# Patient Record
Sex: MALE | Race: WHITE | Employment: FULL TIME | URBAN - METROPOLITAN AREA
[De-identification: names, ages, dates, MRNs, and addresses within clinical notes are randomized per-mention and may not be internally consistent; named-entity substitution may affect disease eponyms.]

---

## 2022-01-06 ENCOUNTER — HOSPITAL ENCOUNTER (EMERGENCY)
Age: 32
Discharge: HOME | End: 2022-01-06
Attending: EMERGENCY MEDICINE

## 2022-01-06 VITALS
HEIGHT: 76 IN | HEART RATE: 108 BPM | RESPIRATION RATE: 16 BRPM | WEIGHT: 210 LBS | TEMPERATURE: 98.4 F | SYSTOLIC BLOOD PRESSURE: 123 MMHG | OXYGEN SATURATION: 95 % | DIASTOLIC BLOOD PRESSURE: 70 MMHG | BODY MASS INDEX: 25.57 KG/M2

## 2022-01-06 DIAGNOSIS — U07.1 COVID-19: Primary | ICD-10-CM

## 2022-01-06 LAB
COVID-19 RAPID TEST, COVR: DETECTED
SOURCE, COVRS: ABNORMAL

## 2022-01-06 PROCEDURE — 99283 EMERGENCY DEPT VISIT LOW MDM: CPT

## 2022-01-06 PROCEDURE — 87635 SARS-COV-2 COVID-19 AMP PRB: CPT

## 2022-01-06 NOTE — ED NOTES
I have reviewed discharge instructions with the patient. The patient verbalized understanding. Patient left ED via Discharge Method: ambulatory to Home with self. Opportunity for questions and clarification provided. Patient given 0 scripts. To continue your aftercare when you leave the hospital, you may receive an automated call from our care team to check in on how you are doing. This is a free service and part of our promise to provide the best care and service to meet your aftercare needs.  If you have questions, or wish to unsubscribe from this service please call [phone]. Thank you for Choosing our [facility] Emergency Department.

## 2022-01-06 NOTE — ED TRIAGE NOTES
Patients roommate with COVID, started with generalized body aches, headache, cough yesterday and woke up sweating this morning. Masked. Unvaccinated.

## 2022-01-06 NOTE — Clinical Note
[id number] [address] EMERGENCY DEPT  [address] [address]  [phone]    Work/School Note    Date: 1/6/2022     To Whom It May concern:    Meryl Mcclain was evaluated by the following provider(s):  Attending Provider: Susi Null MD  Physician Assistant: Shelly Cid virus is suspected. Per the CDC guidelines we recommend home isolation until the following conditions are all met:    1. At least five days have passed since symptoms first appeared and/or had a close exposure,   2. After home isolation for five days, wearing a mask around others for the next five days,  3. At least 24 have passed since last fever without the use of fever-reducing medications and  4.  Symptoms (eg cough, shortness of breath) have improved      Sincerely,          RAHUL Hall

## 2022-01-06 NOTE — DISCHARGE INSTRUCTIONS
Stay home and quarantined for at least 5 days, then if rapid test negative can return to work with a mask for 5 days. Drink plenty of fluids, rest, return to the ED if worsening in any way.

## 2022-01-06 NOTE — ED PROVIDER NOTES
Patient is here with nasal congestion, dry cough, body aches, chills, fever and not feeling well for 1 day. He did not get a COVID vaccine. No chest pain or shortness of breath, abdominal pain, dizziness, dyspnea on exertion, orthopnea, neck pain/stiffness, rash, swelling of his arms or legs, trouble with urination or bowel movements or other symptoms. He was ambulatory to the room without difficulty, and well-hydrated. The history is provided by the patient. Nasal Congestion  This is a new problem. The current episode started yesterday. The problem has not changed since onset. Pertinent negatives include no chest pain, no abdominal pain, no headaches and no shortness of breath. Nothing aggravates the symptoms. Nothing relieves the symptoms. He has tried nothing for the symptoms. No past medical history on file. Past Surgical History:   Procedure Laterality Date    HX GI      HX ORTHOPAEDIC      neck surgery         No family history on file. Social History     Socioeconomic History    Marital status: SINGLE     Spouse name: Not on file    Number of children: Not on file    Years of education: Not on file    Highest education level: Not on file   Occupational History    Not on file   Tobacco Use    Smoking status: Never Smoker    Smokeless tobacco: Not on file   Substance and Sexual Activity    Alcohol use: No    Drug use: Not on file    Sexual activity: Not on file   Other Topics Concern    Not on file   Social History Narrative    Not on file     Social Determinants of Health     Financial Resource Strain:     Difficulty of Paying Living Expenses: Not on file   Food Insecurity:     Worried About Running Out of Food in the Last Year: Not on file    [place] of Food in the Last Year: Not on file   Transportation Needs:     Lack of Transportation (Medical): Not on file    Lack of Transportation (Non-Medical):  Not on file   Physical Activity:     Days of Exercise per Week: Not on file   [place] of Exercise per Session: Not on file   Stress:     Feeling of Stress : Not on file   Social Connections:     Frequency of Communication with Friends and Family: Not on file    Frequency of Social Gatherings with Friends and Family: Not on file    Attends Buddhist Services: Not on file    Active Member of Clubs or Organizations: Not on file    Attends Club or Organization Meetings: Not on file    Marital Status: Not on file   Intimate Partner Violence:     Fear of Current or Ex-Partner: Not on file    Emotionally Abused: Not on file    Physically Abused: Not on file    Sexually Abused: Not on file   Housing Stability:     Unable to Pay for Housing in the Last Year: Not on file    Number of [place] in the Last Year: Not on file    Unstable Housing in the Last Year: Not on file         ALLERGIES: Patient has no known allergies. Review of Systems   Constitutional: Positive for chills. HENT: Positive for congestion and rhinorrhea. Eyes: Negative. Respiratory: Positive for cough. Negative for shortness of breath. Cardiovascular: Negative. Negative for chest pain. Gastrointestinal: Negative. Negative for abdominal pain. Genitourinary: Negative. Musculoskeletal: Positive for myalgias. Skin: Negative. Neurological: Negative. Negative for headaches. Psychiatric/Behavioral: Negative. All other systems reviewed and are negative. Vitals:    01/06/22 0848   BP: 123/70   Pulse: (!) 108   Resp: 16   Temp: 98.4 °F (36.9 °C)   SpO2: 95%   Weight: 95.3 kg (210 lb)   Height: 6' 4\" (1.93 m)            Physical Exam  Vitals and nursing note reviewed. Constitutional:       Appearance: Normal appearance. He is well-developed. HENT:      Head: Normocephalic and atraumatic. Right Ear: Tympanic membrane, ear canal and external ear normal.      Left Ear: Tympanic membrane, ear canal and external ear normal.      Nose: Rhinorrhea present.       Mouth/Throat: Mouth: Mucous membranes are moist.   Eyes:      Conjunctiva/sclera: Conjunctivae normal.      Pupils: Pupils are equal, round, and reactive to light. Cardiovascular:      Rate and Rhythm: Normal rate and regular rhythm. Pulses: Normal pulses. Heart sounds: Normal heart sounds. Pulmonary:      Effort: Pulmonary effort is normal.      Breath sounds: Normal breath sounds. Abdominal:      General: Bowel sounds are normal.      Palpations: Abdomen is soft. Musculoskeletal:         General: Normal range of motion. Cervical back: Normal range of motion and neck supple. Skin:     General: Skin is warm and dry. Capillary Refill: Capillary refill takes less than 2 seconds. Neurological:      General: No focal deficit present. Mental Status: He is alert and oriented to person, place, and time. Deep Tendon Reflexes: Reflexes are normal and symmetric. Psychiatric:         Mood and Affect: Mood normal.         Behavior: Behavior normal.         Thought Content: Thought content normal.         Judgment: Judgment normal.          MDM  Number of Diagnoses or Management Options     Amount and/or Complexity of Data Reviewed  Clinical lab tests: reviewed    Risk of Complications, Morbidity, and/or Mortality  Presenting problems: moderate  Diagnostic procedures: moderate  Management options: moderate    Patient Progress  Patient progress: stable         Procedures    The patient was observed in the ED. Results Reviewed:      Recent Results (from the past 24 hour(s))   COVID-19 RAPID TEST    Collection Time: 01/06/22  8:52 AM   Result Value Ref Range    Specimen source NASAL      COVID-19 rapid test Detected (AA) NOTD       Drink plenty of fluids, rest, return to the ED if worsening in any way. Stay home and quarantine for 5 days and if rapid test is negative can return to work with the mask for 5 more days.   Use over-the-counter ibuprofen and/or Tylenol as directed if needed for pain or fever.  I discussed the results of all labs, procedures, radiographs, and treatments with the patient and available family. Treatment plan is agreed upon and the patient is ready for discharge. All voiced understanding of the discharge plan and medication instructions or changes as appropriate. Questions about treatment in the ED were answered. All were encouraged to return should symptoms worsen or new problems develop.

## 2022-07-21 ENCOUNTER — HOSPITAL ENCOUNTER (EMERGENCY)
Age: 32
Discharge: HOME | End: 2022-07-21
Attending: EMERGENCY MEDICINE

## 2022-07-21 ENCOUNTER — HOSPITAL ENCOUNTER (EMERGENCY)
Dept: GENERAL RADIOLOGY | Age: 32
Discharge: HOME | End: 2022-07-24

## 2022-07-21 VITALS
HEIGHT: 76 IN | OXYGEN SATURATION: 98 % | SYSTOLIC BLOOD PRESSURE: 137 MMHG | DIASTOLIC BLOOD PRESSURE: 91 MMHG | TEMPERATURE: 98.1 F | HEART RATE: 90 BPM | RESPIRATION RATE: 16 BRPM | WEIGHT: 208 LBS | BODY MASS INDEX: 25.33 KG/M2

## 2022-07-21 DIAGNOSIS — S39.012A STRAIN OF LUMBAR REGION, INITIAL ENCOUNTER: Primary | ICD-10-CM

## 2022-07-21 PROCEDURE — 72100 X-RAY EXAM L-S SPINE 2/3 VWS: CPT

## 2022-07-21 PROCEDURE — 99284 EMERGENCY DEPT VISIT MOD MDM: CPT

## 2022-07-21 PROCEDURE — 6370000000 HC RX 637 (ALT 250 FOR IP): Performed by: PHYSICIAN ASSISTANT

## 2022-07-21 PROCEDURE — 6360000002 HC RX W HCPCS: Performed by: PHYSICIAN ASSISTANT

## 2022-07-21 PROCEDURE — 96372 THER/PROPH/DIAG INJ SC/IM: CPT

## 2022-07-21 RX ORDER — MELOXICAM 15 MG/1
15 TABLET ORAL DAILY
Qty: 30 TABLET | Refills: 1 | Status: SHIPPED | OUTPATIENT
Start: 2022-07-21

## 2022-07-21 RX ORDER — CYCLOBENZAPRINE HCL 10 MG
10 TABLET ORAL NIGHTLY PRN
Qty: 30 TABLET | Refills: 0 | Status: SHIPPED | OUTPATIENT
Start: 2022-07-21 | End: 2022-07-31

## 2022-07-21 RX ORDER — KETOROLAC TROMETHAMINE 30 MG/ML
30 INJECTION, SOLUTION INTRAMUSCULAR; INTRAVENOUS ONCE
Status: COMPLETED | OUTPATIENT
Start: 2022-07-21 | End: 2022-07-21

## 2022-07-21 RX ORDER — CYCLOBENZAPRINE HCL 10 MG
10 TABLET ORAL
Status: COMPLETED | OUTPATIENT
Start: 2022-07-21 | End: 2022-07-21

## 2022-07-21 RX ADMIN — KETOROLAC TROMETHAMINE 30 MG: 30 INJECTION, SOLUTION INTRAMUSCULAR; INTRAVENOUS at 18:53

## 2022-07-21 RX ADMIN — CYCLOBENZAPRINE 10 MG: 10 TABLET, FILM COATED ORAL at 18:52

## 2022-07-21 ASSESSMENT — PAIN - FUNCTIONAL ASSESSMENT: PAIN_FUNCTIONAL_ASSESSMENT: 0-10

## 2022-07-21 ASSESSMENT — ENCOUNTER SYMPTOMS
BACK PAIN: 1
COUGH: 0

## 2022-07-21 ASSESSMENT — PAIN DESCRIPTION - LOCATION: LOCATION: BACK

## 2022-07-21 ASSESSMENT — PAIN SCALES - GENERAL: PAINLEVEL_OUTOF10: 9

## 2022-07-21 NOTE — ED NOTES
I have reviewed discharge instructions with the patient. The patient verbalized understanding. Patient left ED via Discharge Method: ambulatory to Home with self. Opportunity for questions and clarification provided. Patient given 2 scripts. To continue your aftercare when you leave the hospital, you may receive an automated call from our care team to check in on how you are doing. This is a free service and part of our promise to provide the best care and service to meet your aftercare needs.  If you have questions, or wish to unsubscribe from this service please call [phone]. Thank you for Choosing our [facility] Emergency Department.         DEBORAH Lechuga  07/21/22 1954

## 2022-07-21 NOTE — ED TRIAGE NOTES
Patient co lower back pain that started yesterday. Pt denies any injury.   Pt denies any urinary symptoms but has hx kidney stones

## 2022-07-21 NOTE — ED PROVIDER NOTES
[employer] Emergency Department Provider Note                   PCP:                None Provider               Age: 33 y.o. Sex: male       ICD-10-CM    1. Strain of lumbar region, initial encounter  S39.012A           DISPOSITION Decision To Discharge 07/21/2022 07:46:18 PM        MDM  Number of Diagnoses or Management Options  Strain of lumbar region, initial encounter  Diagnosis management comments: Presented with back pain x2 days. Started yesterday. Work-up was unremarkable. Will treat symptomatically. Risk of Complications, Morbidity, and/or Mortality  Presenting problems: moderate  Diagnostic procedures: moderate  Management options: moderate    Patient Progress  Patient progress: stable       Orders Placed This Encounter   Procedures    XR LUMBAR SPINE (2-3 VIEWS)    POCT Urine Dipstick        Gerry Boothe is a 33 y.o. male who presents to the Emergency Department with chief complaint of    Chief Complaint   Patient presents with    Back Pain      Patient is a 70-year-old male presents this department with chief complaint of lower back pain. Reports his pain started yesterday. Has history of kidney stones and thinks he may be in the past once he became hydrated. States pain only got worse. Pain was initially on the left side however now is across his lumbar paraspinal region. Worse with palpation, straight leg test raise, weightbearing, standing. Alleviated by position. Pain is described as aching and does not radiate. The history is provided by the patient. No  was used. Review of Systems   Constitutional:  Negative for chills and fever. Respiratory:  Negative for cough. Musculoskeletal:  Positive for back pain. Neurological:  Negative for headaches. All other systems reviewed and are negative. No past medical history on file.      Past Surgical History:   Procedure Laterality Date    GI      ORTHOPEDIC SURGERY      neck surgery        No family history on file. Social History     Socioeconomic History    Marital status: Single   Tobacco Use    Smoking status: Never   Substance and Sexual Activity    Alcohol use: No         Patient has no known allergies. Previous Medications    KETOROLAC (TORADOL) 10 MG TABLET    Take 10 mg by mouth 3 times daily    ONDANSETRON (ZOFRAN) 8 MG TABLET    Take 8 mg by mouth every 8 hours as needed        Vitals signs and nursing note reviewed. Patient Vitals for the past 4 hrs:   Temp Pulse Resp BP SpO2   07/21/22 1729 98.1 °F (36.7 °C) 90 16 (!) 137/91 98 %          Physical Exam  Vitals and nursing note reviewed. Constitutional:       Appearance: Normal appearance. He is normal weight. HENT:      Head: Normocephalic and atraumatic. Nose: Nose normal.      Mouth/Throat:      Mouth: Mucous membranes are moist.   Eyes:      Pupils: Pupils are equal, round, and reactive to light. Cardiovascular:      Rate and Rhythm: Normal rate. Pulmonary:      Effort: Pulmonary effort is normal.   Abdominal:      General: Abdomen is flat. Palpations: Abdomen is soft. Musculoskeletal:      Cervical back: Normal.      Thoracic back: Normal.      Lumbar back: Tenderness present. Comments: Bilateral paraspinal lumbar tenderness without any appreciable spasm or point tenderness on palpation of the lumbar spine. No deformity, step-off or crepitus appreciated. Neurological:      Mental Status: He is alert. Procedures      Labs Reviewed - No data to display     XR LUMBAR SPINE (2-3 VIEWS)   Final Result      1. Degenerative disc height loss in lower lumbar spine. CPT code(s) [id number]                                      Voice dictation software was used during the making of this note. This software is not perfect and grammatical and other typographical errors may be present. This note has not been completely proofread for errors.      Ana Lind  07/21/22 1954

## 2023-01-28 ENCOUNTER — HOSPITAL ENCOUNTER (EMERGENCY)
Age: 33
Discharge: HOME | End: 2023-01-28
Attending: EMERGENCY MEDICINE

## 2023-01-28 ENCOUNTER — APPOINTMENT (OUTPATIENT)
Dept: GENERAL RADIOLOGY | Age: 33
End: 2023-01-28

## 2023-01-28 VITALS
SYSTOLIC BLOOD PRESSURE: 148 MMHG | RESPIRATION RATE: 14 BRPM | HEART RATE: 86 BPM | OXYGEN SATURATION: 97 % | WEIGHT: 190 LBS | BODY MASS INDEX: 23.14 KG/M2 | TEMPERATURE: 98.8 F | DIASTOLIC BLOOD PRESSURE: 111 MMHG | HEIGHT: 76 IN

## 2023-01-28 DIAGNOSIS — R03.0 ELEVATED BLOOD PRESSURE READING: ICD-10-CM

## 2023-01-28 DIAGNOSIS — S61.512A LACERATION OF LEFT WRIST, INITIAL ENCOUNTER: Primary | ICD-10-CM

## 2023-01-28 PROCEDURE — 99283 EMERGENCY DEPT VISIT LOW MDM: CPT

## 2023-01-28 PROCEDURE — 12031 INTMD RPR S/A/T/EXT 2.5 CM/<: CPT

## 2023-01-28 PROCEDURE — 73110 X-RAY EXAM OF WRIST: CPT

## 2023-01-28 PROCEDURE — 12001 RPR S/N/AX/GEN/TRNK 2.5CM/<: CPT

## 2023-01-28 PROCEDURE — 6370000000 HC RX 637 (ALT 250 FOR IP): Performed by: NURSE PRACTITIONER

## 2023-01-28 RX ORDER — LIDOCAINE HYDROCHLORIDE 10 MG/ML
5 INJECTION, SOLUTION INFILTRATION; PERINEURAL ONCE
Status: DISCONTINUED | OUTPATIENT
Start: 2023-01-28 | End: 2023-01-28

## 2023-01-28 RX ORDER — GINSENG 100 MG
CAPSULE ORAL ONCE
Status: COMPLETED | OUTPATIENT
Start: 2023-01-28 | End: 2023-01-28

## 2023-01-28 RX ORDER — CEPHALEXIN 500 MG/1
500 CAPSULE ORAL 4 TIMES DAILY
Qty: 28 CAPSULE | Refills: 0 | Status: SHIPPED | OUTPATIENT
Start: 2023-01-28 | End: 2023-02-04

## 2023-01-28 RX ADMIN — BACITRACIN: 500 OINTMENT TOPICAL at 19:40

## 2023-01-28 ASSESSMENT — LIFESTYLE VARIABLES
HOW MANY STANDARD DRINKS CONTAINING ALCOHOL DO YOU HAVE ON A TYPICAL DAY: 1 OR 2
HOW OFTEN DO YOU HAVE A DRINK CONTAINING ALCOHOL: MONTHLY OR LESS

## 2023-01-28 ASSESSMENT — PAIN SCALES - GENERAL: PAINLEVEL_OUTOF10: 0

## 2023-01-28 ASSESSMENT — PAIN - FUNCTIONAL ASSESSMENT: PAIN_FUNCTIONAL_ASSESSMENT: 0-10

## 2023-01-28 NOTE — ED PROVIDER NOTES
Emergency Department Provider Note                   PCP:                On File Not (Inactive)               Age: 33 y.o. Sex: male       ICD-10-CM    1. Laceration of left wrist, initial encounter  S61.512A Alexander (Hand Surgery)      2. Elevated blood pressure reading  R03.0           DISPOSITION     dc    Medical Decision Making  Amount and/or Complexity of Data Reviewed  Radiology: ordered. Risk  OTC drugs. Prescription drug management. Well-appearing, pleasant 78-year-old male with accidental injury to his left wrist.  Each cut on glass while assisting to repair a garage door. Denies other injury, fight bite, other. Denies pain. Denies numbness/tingling as weakness. He has 1 small avulsion type laceration at the inner left wrist as well as 3 or facial appearing lacerations. There is no visualized contamination or foreign body. There is no other injury. He is neurovascular intact. Bleeding controlled on arrival.  He has intact distal pulses, he is able to touch his finger to thumb, full active range of motion/strength of the affected fingers and hand, wrist.  Normal skin color and temperature. No other obvious injury. States he had tetanus booster updated in the last several months. Denies all other complaint. XR With no acute process or foreign body noted. Laceration repair performed as in procedure note, patient tolerates well. Pain is well controlled. Directed on appropriate therapeutic measures, to keep dry for the next 24 hours, after which time he may wash with clean running water, but not submerge or exposed to natural water sources. Will discharge with rx antibiotics, instructed on infection prevention and identification. Return to the ED immediately for any new, worsening, concerning symptoms. Antibiotic ointment and dressings applied.   Placed referral with Ortho/hand and advised the patient to follow-up closely with this group to evaluate for possible tendon damage rather complication. All questions were answered. Patient stable for discharge home. Patient is afebrile, hemodynamically stable and in no acute distress. Patient is not ill-appearing. Discussed patient with attending who reviewed the patient's results and collaborated on care planning. All findings and plans were discussed with the patient. Patient verbalizes desire to be discharged home at this time. All questions answered. Discussed with the patient that an unremarkable evaluation in the ED does not preclude the development or presence of a serious or life threatening condition. Patient was instructed to return immediately for any worsening or change in current symptoms, or if symptoms do not continue to improve. I instructed them to follow up with their primary care provider, own specialist, or medical provider that I am recommending for him within the next 2-3 days     the patient acknowledged understanding plan of care and affirmed approval. Patient is discharged home, with no further complaint. Complexity of Problem: 1 undiagnosed new problem with uncertain prognosis. (4)  The patients assessment required an independent historian: I spoke with a family member. I have conducted an independent ordering and review of X-rays. I have reviewed records from an external source: ED records from outside this hospital.  Considerations: Shared decision making was utilized in the care of this patient. Patient was discharged risks and benefits of hospitalization were discussed. No orders of the defined types were placed in this encounter.        Medications - No data to display    New Prescriptions    No medications on file        Sarah Crow is a 33 y.o. male who presents to the Emergency Department with chief complaint of    Chief Complaint   Patient presents with    Laceration      HPI  64-year-old male presents to the emergency department with complaint of left wrist laceration. Reports accidental injury occurring just prior to ED arrival.  States he was helping a neighbor repair a garage door, when his hand accidentally went through a pane of glass, cutting the wrist.  Reports flow of blood, controlled on arrival.  States tetanus up-to-date. No numbness or tingling, denies other injury. Pain and denies all other complaint. Review of Systems  Constitutional: As in HPI  HENT: Negative     Eyes: Negative   Respiratory: Negative  Cardiovascular: Negative  GI/: As in HPI  Musculoskeletal: Negative   Skin: As in HPI  Allergic/Immunologic: Negative  Neurological: Negative                        No past medical history on file. Past Surgical History:   Procedure Laterality Date    GI      ORTHOPEDIC SURGERY      neck surgery        No family history on file. Social History     Socioeconomic History    Marital status: Single   Tobacco Use    Smoking status: Never   Substance and Sexual Activity    Alcohol use: No        Allergies: Patient has no known allergies. Previous Medications    KETOROLAC (TORADOL) 10 MG TABLET    Take 10 mg by mouth 3 times daily    MELOXICAM (MOBIC) 15 MG TABLET    Take 1 tablet by mouth in the morning. ONDANSETRON (ZOFRAN) 8 MG TABLET    Take 8 mg by mouth every 8 hours as needed        Vitals signs and nursing note reviewed. Patient Vitals for the past 4 hrs:   Pulse BP SpO2   01/28/23 1713 86 (!) 148/111 97 %          Physical Exam   Constitutional: Oriented to person, place, and time. Appears well-developed and well-nourished. No distress. HENT:    Head: Normocephalic and atraumatic   Right Ear: External ear normal.    Left Ear: External ear normal.     Nose: Nose normal.   Mouth/Throat: Mouth normal.    Eyes: Conjunctivae are normal.   Neck: Supple. No tracheal deviation. Cardiovascular: Normal rate, intact distal pulses. Brisk capillary refill intact, less than 2 seconds. Regular rhythm present. No pitting edema. Pulmonary/Chest: Lungs are clear & equal bilaterally. No adventitious sounds. No respiratory distress. Abdominal: Soft. There is no tenderness. Musculoskeletal: No obvious deformity, erythema, edema. Demonstrates full active range of motion of the affected wrist and hand with intact strength and sensation. Able to make fist, up and spread fingers, tendons appearing intact on exam.  Neurological: Alert and oriented to person, place, and time. No numbness/tingling. No loss of sensation. Positive PMS ×4. GCS= 15. Skin: Centimeters jagged laceration of the inner left wrist with a lesser superficial appearing laceration distal to this measuring approximately 2 cm. There is no active bleeding, no visualized foreign body or contamination. Skin is warm and dry. Capillary refill takes less than 2 seconds. No other abrasion, no lesion, no petechiae and no rash noted. Not diaphoretic. No cyanosis, erythema, or pallor. Psychiatric: Normal mood and affect. Behavior is normal.    Nursing note and vitals reviewed.      Lac Repair    Date/Time: 1/28/2023 7:18 PM  Performed by: PAM Solis - CNP  Authorized by: Kathleen Fregoso MD     Consent:     Consent obtained:  Verbal    Consent given by:  Patient    Risks, benefits, and alternatives were discussed: yes      Risks discussed:  Infection, pain, poor cosmetic result, need for additional repair, nerve damage, poor wound healing, vascular damage, tendon damage and retained foreign body    Alternatives discussed:  No treatment  Universal protocol:     Procedure explained and questions answered to patient or proxy's satisfaction: yes      Relevant documents present and verified: yes      Imaging studies available: yes      Patient identity confirmed:  Verbally with patient and arm band  Anesthesia:     Anesthesia method:  Local infiltration    Local anesthetic:  Lidocaine 1% w/o epi  Laceration details:     Location: L wrist.    Length (cm): 1  Pre-procedure details:     Preparation:  Patient was prepped and draped in usual sterile fashion and imaging obtained to evaluate for foreign bodies  Exploration:     Hemostasis achieved with:  Direct pressure    Imaging obtained: x-ray      Imaging outcome: foreign body not noted      Wound exploration: wound explored through full range of motion and entire depth of wound visualized    Treatment:     Area cleansed with:  Povidone-iodine    Amount of cleaning:  Extensive    Irrigation solution:  Sterile water    Irrigation volume:  1000    Irrigation method:  Pressure wash    Visualized foreign bodies/material removed: no    Skin repair:     Repair method:  Sutures    Suture size:  4-0    Suture material:  Nylon    Suture technique:  Simple interrupted    Number of sutures:  3  Approximation:     Approximation:  Close  Repair type:     Repair type:  Simple  Post-procedure details:     Dressing:  Antibiotic ointment and non-adherent dressing    Procedure completion:  Tolerated well, no immediate complications    Is this patient to be included in the SEP-1 core measure due to severe sepsis or septic shock? No Exclusion criteria - the patient is NOT to be included for SEP-1 Core Measure due to: Infection is not suspected     No results found for any visits on 01/28/23. XR WRIST LEFT (MIN 3 VIEWS)   Final Result      1. No acute osseous abnormality. 2.  No radiopaque foreign body. Voice dictation software was used during the making of this note. This software is not perfect and grammatical and other typographical errors may be present. This note has not been completely proofread for errors.      PAM Landry - MASOOD  01/28/23 1924

## 2023-01-28 NOTE — ED TRIAGE NOTES
Pt ambulatory to triage with CO laceration to left wrist. Cut on piece of glass. Bleeding controlled in triage.

## 2023-01-29 NOTE — ED NOTES
I have reviewed discharge instructions with the patient. The patient verbalized understanding. Patient left ED via Discharge Method: ambulatory to Home with (Spouse). Opportunity for questions and clarification provided. Patient given 1 scripts. To continue your aftercare when you leave the hospital, you may receive an automated call from our care team to check in on how you are doing. This is a free service and part of our promise to provide the best care and service to meet your aftercare needs.  If you have questions, or wish to unsubscribe from this service please call [phone]. Thank you for Choosing our [facility] Emergency Department.         Sharifa Mckeon RN  01/28/23 6980

## 2023-01-29 NOTE — DISCHARGE INSTRUCTIONS
Take medications as prescribed. Follow-up with recommended provider in the next 1-2 days. Return to the ED immediately for any new, worsening, concerning symptoms; or for danger signs as discussed. Keep dry for the next 24 hours, after which time you may wash with clean running water, but not submerge or exposed to natural water sources.      Suture removal -- 7 days

## 2023-06-06 ENCOUNTER — HOSPITAL ENCOUNTER (EMERGENCY)
Age: 33
Discharge: HOME | End: 2023-06-06
Attending: STUDENT IN AN ORGANIZED HEALTH CARE EDUCATION/TRAINING PROGRAM
Payer: COMMERCIAL

## 2023-06-06 ENCOUNTER — APPOINTMENT (OUTPATIENT)
Dept: GENERAL RADIOLOGY | Age: 33
End: 2023-06-06
Payer: COMMERCIAL

## 2023-06-06 VITALS
BODY MASS INDEX: 23.02 KG/M2 | HEIGHT: 76 IN | SYSTOLIC BLOOD PRESSURE: 135 MMHG | HEART RATE: 84 BPM | TEMPERATURE: 98.1 F | DIASTOLIC BLOOD PRESSURE: 77 MMHG | WEIGHT: 189 LBS | OXYGEN SATURATION: 98 % | RESPIRATION RATE: 19 BRPM

## 2023-06-06 DIAGNOSIS — S89.92XA INJURY OF LEFT LOWER LEG, INITIAL ENCOUNTER: Primary | ICD-10-CM

## 2023-06-06 PROCEDURE — 99283 EMERGENCY DEPT VISIT LOW MDM: CPT

## 2023-06-06 PROCEDURE — 6370000000 HC RX 637 (ALT 250 FOR IP): Performed by: STUDENT IN AN ORGANIZED HEALTH CARE EDUCATION/TRAINING PROGRAM

## 2023-06-06 PROCEDURE — 73590 X-RAY EXAM OF LOWER LEG: CPT

## 2023-06-06 RX ORDER — HYDROCODONE BITARTRATE AND ACETAMINOPHEN 5; 325 MG/1; MG/1
1 TABLET ORAL
Status: COMPLETED | OUTPATIENT
Start: 2023-06-06 | End: 2023-06-06

## 2023-06-06 RX ORDER — HYDROCODONE BITARTRATE AND ACETAMINOPHEN 7.5; 325 MG/1; MG/1
1 TABLET ORAL EVERY 8 HOURS PRN
Qty: 9 TABLET | Refills: 0 | Status: SHIPPED | OUTPATIENT
Start: 2023-06-06 | End: 2023-06-09

## 2023-06-06 RX ADMIN — HYDROCODONE BITARTRATE AND ACETAMINOPHEN 1 TABLET: 5; 325 TABLET ORAL at 23:05

## 2023-06-06 ASSESSMENT — PAIN SCALES - GENERAL
PAINLEVEL_OUTOF10: 7
PAINLEVEL_OUTOF10: 7

## 2023-06-06 ASSESSMENT — PAIN DESCRIPTION - LOCATION: LOCATION: LEG

## 2023-06-06 ASSESSMENT — PAIN - FUNCTIONAL ASSESSMENT: PAIN_FUNCTIONAL_ASSESSMENT: 0-10

## 2023-06-07 NOTE — DISCHARGE INSTRUCTIONS
Alternate Tylenol and Motrin as needed for pain, reserve Norco for severe, breakthrough pain. Do not drive or operate heavy machinery while taking this medication. Rest, ice and elevate your left lower extremity. You can weight-bear as tolerated. Follow-up with primary care physician within the week as needed. Return immediately to the emergency department for worsening or worrisome symptoms. We would love to help you get a primary care doctor for follow-up after your emergency department visit. Please call [phone] between 7AM - 6PM Monday to Friday. A care navigator will be able to assist you with setting up a doctor close to your home.

## 2023-06-07 NOTE — ED TRIAGE NOTES
Pt to ED with c/o left lower leg pain. Pt states at work today his leg got stuck between a trailer hitch and a metal platform. Pt states was only for about 30 seconds. Pt states no open wounds. Swelling to left lower leg. Pt states pain with ambulation. Pt alert and in NAD at this time.

## 2023-06-07 NOTE — ED NOTES
I have reviewed discharge instructions with the patient. The patient verbalized understanding. Patient left ED via Discharge Method: ambulatory to Home with SO. Opportunity for questions and clarification provided. Patient given 1 scripts. To continue your aftercare when you leave the hospital, you may receive an automated call from our care team to check in on how you are doing. This is a free service and part of our promise to provide the best care and service to meet your aftercare needs.  If you have questions, or wish to unsubscribe from this service please call [phone]. Thank you for Choosing our [facility] Emergency Department.         Lynnerfara Junior RN  06/06/23 [id number]

## 2023-06-07 NOTE — ED PROVIDER NOTES
Emergency Department Provider Note       PCP: On File Not (Inactive)   Age: 29 y.o. Sex: male     DISPOSITION Decision To Discharge 06/06/2023 10:52:54 PM       ICD-10-CM    1. Injury of left lower leg, initial encounter  S89. 92XA HYDROcodone-acetaminophen (NORCO) 7.5-325 MG per tablet          Medical Decision Making     Complexity of Problems Addressed:  Complexity of Problem: 1 acute, uncomplicated illness or injury. Data Reviewed and Analyzed:  Category 1:   I independently ordered and reviewed each unique test.  I reviewed external records: provider visit note from PCP. Category 2:   I interpreted the X-rays. No fracture noted    Category 3: Discussion of management or test interpretation. 28-year-old male presents to the emergency department after injury to his left lower extremity that occurred approximately 1030 this morning. States he got his left lower extremity caught between 2 pieces of equipment at work. This occurred at 10:30 AM.  Reports pain with any ambulation. States he is unable to put very much weight on the left lower extremity. No pain to the knee or ankle. Normal sensation distally. Is able to move the ankle but with pain. Compartments are soft. X-rays negative for fracture. Patient given Cloria Macleod here as well as crutches, work note and prescription for Cloria Macleod.  Sedation precautions given. Patient and family were given strict return precautions including but not limited to signs of compartment syndrome. They voiced understanding and agreement. Risk of Complications and/or Morbidity of Patient Management:  Prescription drug management performed    History     Chilo Nuñez is a 29 y.o. male who presents to the Emergency Department with chief complaint of    Chief Complaint   Patient presents with    Leg Injury      4year-old male presents to the emergency department after injury to his left lower extremity that occurred approximately 1030 this morning.   States he

## 2023-09-03 PROCEDURE — 99284 EMERGENCY DEPT VISIT MOD MDM: CPT

## 2023-09-03 PROCEDURE — 96372 THER/PROPH/DIAG INJ SC/IM: CPT

## 2023-09-04 ENCOUNTER — APPOINTMENT (OUTPATIENT)
Dept: CT IMAGING | Age: 33
End: 2023-09-04
Payer: COMMERCIAL

## 2023-09-04 ENCOUNTER — APPOINTMENT (OUTPATIENT)
Dept: GENERAL RADIOLOGY | Age: 33
End: 2023-09-04
Payer: COMMERCIAL

## 2023-09-04 ENCOUNTER — HOSPITAL ENCOUNTER (EMERGENCY)
Age: 33
Discharge: HOME | End: 2023-09-04
Attending: STUDENT IN AN ORGANIZED HEALTH CARE EDUCATION/TRAINING PROGRAM | Admitting: PHYSICAL MEDICINE & REHABILITATION
Payer: COMMERCIAL

## 2023-09-04 VITALS
TEMPERATURE: 97.7 F | WEIGHT: 185 LBS | SYSTOLIC BLOOD PRESSURE: 139 MMHG | HEART RATE: 70 BPM | DIASTOLIC BLOOD PRESSURE: 78 MMHG | RESPIRATION RATE: 18 BRPM | HEIGHT: 76 IN | OXYGEN SATURATION: 96 % | BODY MASS INDEX: 22.53 KG/M2
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
## 2023-09-04 DIAGNOSIS — M54.2 NECK PAIN: ICD-10-CM

## 2023-09-04 DIAGNOSIS — R51.9 ACUTE NONINTRACTABLE HEADACHE, UNSPECIFIED HEADACHE TYPE: Primary | ICD-10-CM

## 2023-09-04 PROCEDURE — 6360000002 HC RX W HCPCS: Performed by: PHYSICIAN ASSISTANT

## 2023-09-04 PROCEDURE — 96372 THER/PROPH/DIAG INJ SC/IM: CPT

## 2023-09-04 PROCEDURE — 72125 CT NECK SPINE W/O DYE: CPT

## 2023-09-04 PROCEDURE — 70450 CT HEAD/BRAIN W/O DYE: CPT

## 2023-09-04 PROCEDURE — 73030 X-RAY EXAM OF SHOULDER: CPT

## 2023-09-04 PROCEDURE — 6370000000 HC RX 637 (ALT 250 FOR IP): Performed by: PHYSICIAN ASSISTANT

## 2023-09-04 RX ORDER — ONDANSETRON 8 MG/1
8 TABLET, ORALLY DISINTEGRATING ORAL
Status: COMPLETED | OUTPATIENT
Start: 2023-09-04 | End: 2023-09-04

## 2023-09-04 RX ORDER — METHOCARBAMOL 500 MG/1
1500 TABLET, FILM COATED ORAL
Status: COMPLETED | OUTPATIENT
Start: 2023-09-04 | End: 2023-09-04

## 2023-09-04 RX ORDER — MORPHINE SULFATE 4 MG/ML
4 INJECTION INTRAVENOUS ONCE
Status: COMPLETED | OUTPATIENT
Start: 2023-09-04 | End: 2023-09-04

## 2023-09-04 RX ORDER — METHOCARBAMOL 750 MG/1
750 TABLET, FILM COATED ORAL 3 TIMES DAILY
Qty: 15 TABLET | Refills: 0 | Status: SHIPPED | OUTPATIENT
Start: 2023-09-04 | End: 2023-09-09

## 2023-09-04 RX ADMIN — METHOCARBAMOL TABLETS 1500 MG: 500 TABLET, COATED ORAL at 01:10

## 2023-09-04 RX ADMIN — MORPHINE SULFATE 4 MG: 4 INJECTION INTRAVENOUS at 01:07

## 2023-09-04 RX ADMIN — ONDANSETRON 8 MG: 8 TABLET, ORALLY DISINTEGRATING ORAL at 01:07

## 2023-09-04 ASSESSMENT — ENCOUNTER SYMPTOMS
VOMITING: 0
PHOTOPHOBIA: 0
EYE PAIN: 0
NAUSEA: 0
ABDOMINAL PAIN: 0
SHORTNESS OF BREATH: 0

## 2023-09-04 ASSESSMENT — PAIN SCALES - GENERAL
PAINLEVEL_OUTOF10: 10
PAINLEVEL_OUTOF10: 5
PAINLEVEL_OUTOF10: 4

## 2023-09-04 ASSESSMENT — PAIN - FUNCTIONAL ASSESSMENT: PAIN_FUNCTIONAL_ASSESSMENT: 0-10

## 2023-09-04 ASSESSMENT — PAIN DESCRIPTION - LOCATION
LOCATION: HEAD
LOCATION: HEAD

## 2023-09-04 ASSESSMENT — PAIN DESCRIPTION - DESCRIPTORS: DESCRIPTORS: ACHING

## 2023-09-04 NOTE — ED PROVIDER NOTES
Given 9/4/23 0107)   ondansetron (ZOFRAN-ODT) disintegrating tablet 8 mg (8 mg Oral Given 9/4/23 0107)   methocarbamol (ROBAXIN) tablet 1,500 mg (1,500 mg Oral Given 9/4/23 0110)     CT HEAD WO CONTRAST   Final Result       1. No acute intracranial abnormality. Art Olivera M.D.    9/4/2023 1:21:00 AM      CT CERVICAL SPINE WO CONTRAST   Final Result       1. No evidence of acute fracture or traumatic malalignment. 2.  Chronic postoperative findings: ACDF at C6-7, posterior instrumented fusion    from C6 through T1, status-post C7 laminectomy. Art Olivera M.D.    9/4/2023 1:25:00 AM      XR SHOULDER RIGHT (MIN 2 VIEWS)   Final Result   1. No acute osseous abnormality. Tha Villarreal M.D.    9/4/2023 1:12:00 AM        Voice dictation software was used during the making of this note. This software is not perfect and grammatical and other typographical errors may be present. This note has not been completely proofread for errors.      [employer], [email or web address]  09/04/23 0207

## 2023-09-04 NOTE — ED NOTES
Pt arrives via POV c/o headaches, blurred vision that started 1 wk ago.       Yadiel Jones RN  09/04/23 0005

## 2023-09-04 NOTE — DISCHARGE INSTRUCTIONS
Imaging here today is normal.  Take the anti-inflammatory and muscle relaxants as needed for pain. Would encourage ice/heat and gentle stretching of the neck to try and help with the spasm. I have given you the contact information for the occupational health office to call. Return to the ED as needed.

## 2024-06-21 ENCOUNTER — APPOINTMENT (OUTPATIENT)
Dept: GENERAL RADIOLOGY | Age: 34
End: 2024-06-21
Payer: COMMERCIAL

## 2024-06-21 ENCOUNTER — HOSPITAL ENCOUNTER (EMERGENCY)
Age: 34
Discharge: HOME | End: 2024-06-22
Payer: COMMERCIAL

## 2024-06-21 VITALS
BODY MASS INDEX: 22.53 KG/M2 | HEART RATE: 93 BPM | WEIGHT: 185 LBS | OXYGEN SATURATION: 98 % | RESPIRATION RATE: 16 BRPM | DIASTOLIC BLOOD PRESSURE: 84 MMHG | HEIGHT: 76 IN | TEMPERATURE: 98.3 F | SYSTOLIC BLOOD PRESSURE: 130 MMHG

## 2024-06-21 DIAGNOSIS — S61.411A LACERATION OF RIGHT HAND, FOREIGN BODY PRESENCE UNSPECIFIED, INITIAL ENCOUNTER: Primary | ICD-10-CM

## 2024-06-21 DIAGNOSIS — S69.91XA HAND INJURY, RIGHT, INITIAL ENCOUNTER: ICD-10-CM

## 2024-06-21 PROCEDURE — 99283 EMERGENCY DEPT VISIT LOW MDM: CPT

## 2024-06-21 PROCEDURE — 73130 X-RAY EXAM OF HAND: CPT

## 2024-06-21 PROCEDURE — 29125 APPL SHORT ARM SPLINT STATIC: CPT

## 2024-06-21 ASSESSMENT — PAIN SCALES - GENERAL: PAINLEVEL_OUTOF10: 4

## 2024-06-21 ASSESSMENT — PAIN - FUNCTIONAL ASSESSMENT: PAIN_FUNCTIONAL_ASSESSMENT: 0-10

## 2024-06-22 RX ORDER — CEPHALEXIN 500 MG/1
500 CAPSULE ORAL 4 TIMES DAILY
Qty: 28 CAPSULE | Refills: 0 | Status: SHIPPED | OUTPATIENT
Start: 2024-06-22 | End: 2024-06-29

## 2024-06-22 RX ORDER — AMOXICILLIN AND CLAVULANATE POTASSIUM 875; 125 MG/1; MG/1
1 TABLET, FILM COATED ORAL 2 TIMES DAILY
Qty: 20 TABLET | Refills: 0 | Status: SHIPPED | OUTPATIENT
Start: 2024-06-22 | End: 2024-06-22

## 2024-06-22 RX ORDER — LIDOCAINE HYDROCHLORIDE 10 MG/ML
10 INJECTION, SOLUTION INFILTRATION; PERINEURAL
Status: DISCONTINUED | OUTPATIENT
Start: 2024-06-22 | End: 2024-06-22 | Stop reason: HOSPADM

## 2024-06-22 NOTE — ED PROVIDER NOTES
Emergency Department Provider Note       PCP: Not, On File (Inactive)   Age: 34 y.o.   Sex: male     DISPOSITION Decision To Discharge 06/22/2024 12:44:23 AM       ICD-10-CM    1. Laceration of right hand, foreign body presence unspecified, initial encounter  S61.411A [facility]      2. Hand injury, right, initial encounter  S69.91XA [facility]          Medical Decision Making     Patient is a 34-year-old male presenting after a laceration to the dorsal aspect of his left hand.  Patient was clearing out glass at home when it shattered and lacerated the dorsal aspect of the left hand over top of the fifth metacarpal region.  Patient states this happened about 3 hours prior to arrival.  Bleeding has stopped and has been controlled since.  He feels as if there is still a piece of glass in that hand.      On presentation, patient is afebrile, vital signs are stable, and he is well-appearing in no acute distress.  There is a small V like laceration present to the dorsal aspect of the left hand over the fifth metacarpal region.  Bleeding is controlled at this time.  Will was explored in a bloodless field without any evidence of obvious retained foreign body.  The wound was intensely irrigated.  Patient does have slight decreased range of motion in his pinky, no evidence of obvious tendon injury.    X-ray was read as normal by the radiologist, however there is an overlying possible foreign body to the distal aspect of the fifth metacarpal.  Patient does have tenderness in this area and feels a continued foreign body sensation.    Truly unsure if this is a retained foreign body as the laceration is more proximal and the possible foreign body appears to be on the palmar aspect of the left hand on the x-ray.  Nevertheless as there is possibility of retained fb, I spoke with orthopedics DAY[facility] and recommended irrigation, placed in a splint, and referring

## 2024-06-22 NOTE — DISCHARGE INSTRUCTIONS
Please call the orthopedic to follow up on Monday. Begin taking the antibiotics as prescribed. Return to the ED with any new or worsening symptoms.

## 2024-06-22 NOTE — ED TRIAGE NOTES
Pt to ED with c/o laceration to left hand. Pt states was attempting to fix a window. Pt states was forcing it and turned to check on the baby when it slipped. Pt states tetanus shot last year. Bleeding controlled. Pt alert and in no acute distress at this time.